# Patient Record
Sex: FEMALE | Race: WHITE | URBAN - METROPOLITAN AREA
[De-identification: names, ages, dates, MRNs, and addresses within clinical notes are randomized per-mention and may not be internally consistent; named-entity substitution may affect disease eponyms.]

---

## 2021-05-26 ENCOUNTER — LAB OUTSIDE AN ENCOUNTER (OUTPATIENT)
Dept: URBAN - METROPOLITAN AREA CLINIC 115 | Facility: CLINIC | Age: 36
End: 2021-05-26

## 2021-05-26 ENCOUNTER — WEB ENCOUNTER (OUTPATIENT)
Dept: URBAN - METROPOLITAN AREA CLINIC 115 | Facility: CLINIC | Age: 36
End: 2021-05-26

## 2021-05-26 ENCOUNTER — DASHBOARD ENCOUNTERS (OUTPATIENT)
Age: 36
End: 2021-05-26

## 2021-05-26 ENCOUNTER — OFFICE VISIT (OUTPATIENT)
Dept: URBAN - METROPOLITAN AREA CLINIC 115 | Facility: CLINIC | Age: 36
End: 2021-05-26
Payer: MEDICAID

## 2021-05-26 DIAGNOSIS — K58.1 IRRITABLE BOWEL SYNDROME WITH CONSTIPATION: ICD-10-CM

## 2021-05-26 DIAGNOSIS — R10.13 EPIGASTRIC PAIN: ICD-10-CM

## 2021-05-26 DIAGNOSIS — R10.32 LLQ ABDOMINAL PAIN: ICD-10-CM

## 2021-05-26 PROBLEM — 440630006: Status: ACTIVE | Noted: 2021-05-26

## 2021-05-26 PROCEDURE — 99214 OFFICE O/P EST MOD 30 MIN: CPT | Performed by: INTERNAL MEDICINE

## 2021-05-26 RX ORDER — HYOSCYAMINE SULFATE 0.12 MG/1
ONE TAB THREE TIMES A DAY AS NEEDED FOR CRAMPING IN ABDOMEN TABLET, ORALLY DISINTEGRATING ORAL
Qty: 90 | Refills: 2 | Status: ON HOLD | COMMUNITY
Start: 2020-02-12

## 2021-05-26 RX ORDER — POLYETHYLENE GLYCOL 3350 17 G/17G
AS DIRECTED POWDER, FOR SOLUTION ORAL
Qty: 238 GRAM | Refills: 0 | OUTPATIENT
Start: 2021-05-26 | End: 2021-05-27

## 2021-05-26 NOTE — HPI-TODAY'S VISIT:
Shell Bailey is here for follow-up.  She is seen by Dr. Cui and myself last seen in October 2019.  Today she is here complaining of having significant fatigue tiredness abdominal distention gaseous bloating and distention.  She thinks that she was bit by a bug and she self diagnosed herself as Chagas disease based upon her symptom complex.  Patient stated she was diagnosed with some chest discomfort and dyspnea palpitations.  Currently she is under the care of cardiologist and has gone through Holter monitor and all that however I do not have record records available but as per patient it was she was told to have unremarkable work-up.  She also complains of having significant constipation bloating and distention.  Without any laxatives she has bowel movements every 3 days.  She does take prunes which helps her lower GI symptoms.  Also reports having abdominal fullness after eating food food and also early satiety.  Upper endoscopy in 2019 showed erosive gastritis and erosive esophagitis and duodenitis.  Her colonoscopy was in September 2017 was unremarkable.

## 2021-06-01 ENCOUNTER — OFFICE VISIT (OUTPATIENT)
Dept: URBAN - METROPOLITAN AREA SURGERY CENTER 13 | Facility: SURGERY CENTER | Age: 36
End: 2021-06-01
Payer: MEDICAID

## 2021-06-01 ENCOUNTER — TELEPHONE ENCOUNTER (OUTPATIENT)
Dept: URBAN - METROPOLITAN AREA CLINIC 92 | Facility: CLINIC | Age: 36
End: 2021-06-01

## 2021-06-01 ENCOUNTER — TELEPHONE ENCOUNTER (OUTPATIENT)
Dept: URBAN - METROPOLITAN AREA CLINIC 78 | Facility: CLINIC | Age: 36
End: 2021-06-01

## 2021-06-01 DIAGNOSIS — K29.80 ACUTE DUODENITIS: ICD-10-CM

## 2021-06-01 DIAGNOSIS — R10.13 ABDOMINAL DISCOMFORT, EPIGASTRIC: ICD-10-CM

## 2021-06-01 DIAGNOSIS — K29.60 ADENOPAPILLOMATOSIS GASTRICA: ICD-10-CM

## 2021-06-01 DIAGNOSIS — K20.80 ESOPHAGITIS, LOS ANGELES GRADE A: ICD-10-CM

## 2021-06-01 PROCEDURE — 43239 EGD BIOPSY SINGLE/MULTIPLE: CPT | Performed by: INTERNAL MEDICINE

## 2021-06-01 PROCEDURE — G8907 PT DOC NO EVENTS ON DISCHARG: HCPCS | Performed by: INTERNAL MEDICINE

## 2021-06-01 RX ORDER — HYOSCYAMINE SULFATE 0.12 MG/1
ONE TAB THREE TIMES A DAY AS NEEDED FOR CRAMPING IN ABDOMEN TABLET, ORALLY DISINTEGRATING ORAL
Qty: 90 | Refills: 2 | Status: ON HOLD | COMMUNITY
Start: 2020-02-12

## 2021-06-01 RX ORDER — FLUCONAZOLE 100 MG/1
2 TABS DAY ONE, THEN 1 TAB FOR 13 DAYS TABLET ORAL ONCE A DAY
Qty: 15 TABLET | Refills: 0 | OUTPATIENT
Start: 2021-06-01 | End: 2021-06-15

## 2021-06-01 RX ORDER — OMEPRAZOLE 40 MG/1
1 CAPSULE 30 MINUTES BEFORE MORNING MEAL CAPSULE, DELAYED RELEASE ORAL ONCE A DAY
Qty: 30 CAPSULE | Refills: 1 | OUTPATIENT
Start: 2021-06-01

## 2021-06-18 PROBLEM — 301716002: Status: ACTIVE | Noted: 2021-05-26

## 2021-07-01 ENCOUNTER — OFFICE VISIT (OUTPATIENT)
Dept: URBAN - METROPOLITAN AREA SURGERY CENTER 13 | Facility: SURGERY CENTER | Age: 36
End: 2021-07-01
Payer: MEDICAID

## 2021-07-01 DIAGNOSIS — R10.31 ABDOMINAL CRAMPING IN RIGHT LOWER QUADRANT: ICD-10-CM

## 2021-07-01 DIAGNOSIS — R10.32 ABDOMINAL CRAMPING IN LEFT LOWER QUADRANT: ICD-10-CM

## 2021-07-01 PROCEDURE — G8907 PT DOC NO EVENTS ON DISCHARG: HCPCS | Performed by: INTERNAL MEDICINE

## 2021-07-01 PROCEDURE — 45378 DIAGNOSTIC COLONOSCOPY: CPT | Performed by: INTERNAL MEDICINE
